# Patient Record
Sex: FEMALE | Race: WHITE | NOT HISPANIC OR LATINO | Employment: PART TIME | URBAN - NONMETROPOLITAN AREA
[De-identification: names, ages, dates, MRNs, and addresses within clinical notes are randomized per-mention and may not be internally consistent; named-entity substitution may affect disease eponyms.]

---

## 2019-08-22 ENCOUNTER — OFFICE VISIT (OUTPATIENT)
Dept: CARDIOLOGY | Facility: CLINIC | Age: 34
End: 2019-08-22

## 2019-08-22 VITALS
HEART RATE: 76 BPM | BODY MASS INDEX: 32.43 KG/M2 | HEIGHT: 63 IN | WEIGHT: 183 LBS | SYSTOLIC BLOOD PRESSURE: 116 MMHG | DIASTOLIC BLOOD PRESSURE: 77 MMHG | OXYGEN SATURATION: 100 %

## 2019-08-22 DIAGNOSIS — R07.9 CHEST PAIN, UNSPECIFIED TYPE: Primary | ICD-10-CM

## 2019-08-22 DIAGNOSIS — R00.2 PALPITATIONS: ICD-10-CM

## 2019-08-22 DIAGNOSIS — I10 ESSENTIAL HYPERTENSION: ICD-10-CM

## 2019-08-22 DIAGNOSIS — R06.02 SOB (SHORTNESS OF BREATH): ICD-10-CM

## 2019-08-22 PROCEDURE — 93000 ELECTROCARDIOGRAM COMPLETE: CPT | Performed by: PHYSICIAN ASSISTANT

## 2019-08-22 PROCEDURE — 99204 OFFICE O/P NEW MOD 45 MIN: CPT | Performed by: PHYSICIAN ASSISTANT

## 2019-08-22 RX ORDER — MONTELUKAST SODIUM 10 MG/1
TABLET ORAL DAILY
COMMUNITY
Start: 2019-08-12

## 2019-08-22 RX ORDER — ERGOCALCIFEROL 1.25 MG/1
CAPSULE ORAL WEEKLY
COMMUNITY
Start: 2019-07-31

## 2019-08-22 RX ORDER — IBUPROFEN 800 MG/1
TABLET ORAL AS NEEDED
COMMUNITY
Start: 2019-08-12

## 2019-08-22 RX ORDER — LISINOPRIL 10 MG/1
10 TABLET ORAL DAILY
Qty: 30 TABLET | Refills: 11 | Status: SHIPPED | OUTPATIENT
Start: 2019-08-22

## 2019-08-22 RX ORDER — LISINOPRIL 20 MG/1
TABLET ORAL DAILY
COMMUNITY
Start: 2019-08-16 | End: 2019-08-22

## 2019-08-22 RX ORDER — ALBUTEROL SULFATE 90 UG/1
2 AEROSOL, METERED RESPIRATORY (INHALATION) EVERY 4 HOURS PRN
COMMUNITY

## 2019-08-22 RX ORDER — LORATADINE 10 MG/1
TABLET ORAL DAILY
COMMUNITY
Start: 2019-08-12

## 2019-08-22 RX ORDER — METOPROLOL SUCCINATE 25 MG/1
25 TABLET, EXTENDED RELEASE ORAL DAILY
Qty: 30 TABLET | Refills: 11 | Status: SHIPPED | OUTPATIENT
Start: 2019-08-22

## 2019-08-22 NOTE — PATIENT INSTRUCTIONS

## 2019-08-22 NOTE — PROGRESS NOTES
Subjective   Mona Becerra is a 34 y.o. female     Chief Complaint   Patient presents with   • Chest Pain     presents for cardiac eval   • Palpitations   • Hypertension   • Shortness of Breath       HPI    Patient is a 34-year-old female presents to the office for evaluation.  She has no history of coronary artery disease or structural heart disease.  Patient recently had an episode of tachypalpitations.  She describes being at work whenever she had sharp discomfort and a rapid onset of tachycardia.  This caused more discomfort before eventually resolving and caused weakness.  Patient has had similar episodes since then.  She went to see primary.  Echocardiogram was ordered at an outlying facility which was largely unremarkable with only trace MR noted and good LV function.    Patient describes continued to have symptoms.  She was placed on blood pressure medication as she had systolic blood pressures in the 160s.  She is currently on lisinopril.  Chest pain seems to be occurring more related to her palpitations and anything.  She can have them at other times.  She has developed arm discomfort as well.  Dyspnea is mild at baseline with no progressive shortness of breath but significant with palpitations.  No PND orthopnea.    She has not had any syncopal episodes and otherwise is doing well    Current Outpatient Medications   Medication Sig Dispense Refill   • albuterol sulfate  (90 Base) MCG/ACT inhaler Inhale 2 puffs Every 4 (Four) Hours As Needed for Wheezing.     • ibuprofen (ADVIL,MOTRIN) 800 MG tablet As Needed.     • loratadine (CLARITIN) 10 MG tablet Daily.     • montelukast (SINGULAIR) 10 MG tablet Daily.     • vitamin D (ERGOCALCIFEROL) 92759 units capsule capsule 1 (One) Time Per Week.     • lisinopril (PRINIVIL,ZESTRIL) 10 MG tablet Take 1 tablet by mouth Daily. 30 tablet 11   • metoprolol succinate XL (TOPROL-XL) 25 MG 24 hr tablet Take 1 tablet by mouth Daily. 30 tablet 11     No current  "facility-administered medications for this visit.        Hydrocodone and Penicillins    Past Medical History:   Diagnosis Date   • Asthma    • Asthma due to seasonal allergies    • Hypertension        Social History     Socioeconomic History   • Marital status:      Spouse name: Not on file   • Number of children: Not on file   • Years of education: Not on file   • Highest education level: Not on file   Tobacco Use   • Smoking status: Never Smoker   • Smokeless tobacco: Never Used   Substance and Sexual Activity   • Alcohol use: No     Frequency: Never   • Drug use: No           Family History   Problem Relation Age of Onset   • No Known Problems Mother    • Heart attack Father    • Heart disease Father        Review of Systems   Constitutional: Positive for fatigue (better after taking vit D).   HENT:        Sinus issues   Eyes: Positive for visual disturbance.   Respiratory: Positive for shortness of breath (with daily activity due to asthma) and wheezing.    Cardiovascular: Positive for chest pain, palpitations (flutters) and leg swelling (minimal).   Gastrointestinal: Negative.    Endocrine: Negative.    Genitourinary: Negative.    Musculoskeletal: Negative.    Skin: Negative.    Allergic/Immunologic: Positive for environmental allergies.   Neurological: Positive for weakness.   Hematological: Bruises/bleeds easily (bruise).   Psychiatric/Behavioral: Positive for agitation and sleep disturbance (has woke up soa). The patient is nervous/anxious.    All other systems reviewed and are negative.      Objective   Vitals:    08/22/19 1032   BP: 116/77   BP Location: Left arm   Patient Position: Sitting   Pulse: 76   SpO2: 100%   Weight: 83 kg (183 lb)   Height: 160 cm (63\")      /77 (BP Location: Left arm, Patient Position: Sitting)   Pulse 76   Ht 160 cm (63\")   Wt 83 kg (183 lb)   SpO2 100%   BMI 32.42 kg/m²     Lab Results (most recent)     None          Physical Exam   Constitutional: She is " oriented to person, place, and time. She appears well-developed and well-nourished. No distress.   HENT:   Head: Normocephalic and atraumatic.   Eyes: EOM are normal. Pupils are equal, round, and reactive to light.   Neck: No JVD present.   Cardiovascular: Normal rate, regular rhythm, normal heart sounds and intact distal pulses. Exam reveals no gallop and no friction rub.   No murmur heard.  Pulmonary/Chest: Effort normal and breath sounds normal. No respiratory distress. She has no wheezes. She has no rales. She exhibits no tenderness.   Musculoskeletal: Normal range of motion. She exhibits no edema.   Neurological: She is alert and oriented to person, place, and time. No cranial nerve deficit.   Skin: Skin is warm and dry. No rash noted. No erythema. No pallor.   Psychiatric: She has a normal mood and affect. Her behavior is normal.   Nursing note and vitals reviewed.      Procedure     ECG 12 Lead  Date/Time: 8/22/2019 10:40 AM  Performed by: Anma Nixon PA  Authorized by: Anam Nixon PA   Previous ECG: no previous ECG available  Comments: EKG demonstrates sinus rhythm at 73 bpm with no acute ST changes                 Assessment/Plan     Problems Addressed this Visit     None      Visit Diagnoses     Chest pain, unspecified type    -  Primary    Relevant Orders    ECG 12 Lead    Treadmill Stress Test    Cardiac Event Monitor    Palpitations        Relevant Orders    ECG 12 Lead    Treadmill Stress Test    Cardiac Event Monitor    SOB (shortness of breath)        Relevant Orders    ECG 12 Lead    Treadmill Stress Test    Cardiac Event Monitor    Essential hypertension        Relevant Medications    lisinopril (PRINIVIL,ZESTRIL) 10 MG tablet    metoprolol succinate XL (TOPROL-XL) 25 MG 24 hr tablet    Other Relevant Orders    ECG 12 Lead    Treadmill Stress Test    Cardiac Event Monitor              Recommendation  1.  Patient with complaints of chest pain and palpitations.  I would like to evaluate  her rhythm by scheduling event monitor.  2.  I reviewed echocardiogram which demonstrates no significant pathology and good LV function.  I would like to decrease her lisinopril to half and start her on metoprolol which will help with chest pain as well as control of her blood pressure.  Hopefully this will help suppress any arrhythmias as well.  3.  Stress test to rule out ischemia as a contributing cause.  4.  We will see her back for follow-up after testing.  She will follow with primary as scheduled                  Patient's Body mass index is 32.42 kg/m². BMI is above normal parameters. Recommendations include: educational material.         Electronically signed by:

## 2019-09-09 ENCOUNTER — OUTSIDE FACILITY SERVICE (OUTPATIENT)
Dept: CARDIOLOGY | Facility: CLINIC | Age: 34
End: 2019-09-09

## 2019-09-09 PROCEDURE — 93272 ECG/REVIEW INTERPRET ONLY: CPT | Performed by: INTERNAL MEDICINE

## 2019-10-14 ENCOUNTER — TELEPHONE (OUTPATIENT)
Dept: CARDIOLOGY | Facility: CLINIC | Age: 34
End: 2019-10-14

## 2019-10-14 NOTE — TELEPHONE ENCOUNTER
Patient aware of stress test results. She will keep follow up scheduled 11/18/19. Kathy Nava MA    Left message for patient to return call for stress test results. Kathy Nava MA    Patient has follow up scheduled 11/18/19.     ----- Message from NISHA Browne sent at 10/14/2019 11:29 AM EDT -----  Routine follow-up